# Patient Record
Sex: FEMALE | Race: WHITE | NOT HISPANIC OR LATINO | URBAN - METROPOLITAN AREA
[De-identification: names, ages, dates, MRNs, and addresses within clinical notes are randomized per-mention and may not be internally consistent; named-entity substitution may affect disease eponyms.]

---

## 2018-11-01 ENCOUNTER — RECORDS - HEALTHEAST (OUTPATIENT)
Dept: LAB | Facility: CLINIC | Age: 83
End: 2018-11-01

## 2018-11-01 LAB
ALBUMIN SERPL-MCNC: 3.7 G/DL (ref 3.5–5)
ALP SERPL-CCNC: 57 U/L (ref 45–120)
ALT SERPL W P-5'-P-CCNC: 11 U/L (ref 0–45)
ANION GAP SERPL CALCULATED.3IONS-SCNC: 8 MMOL/L (ref 5–18)
AST SERPL W P-5'-P-CCNC: 16 U/L (ref 0–40)
BILIRUB SERPL-MCNC: 0.5 MG/DL (ref 0–1)
BUN SERPL-MCNC: 27 MG/DL (ref 8–28)
CALCIUM SERPL-MCNC: 9.6 MG/DL (ref 8.5–10.5)
CHLORIDE BLD-SCNC: 100 MMOL/L (ref 98–107)
CO2 SERPL-SCNC: 31 MMOL/L (ref 22–31)
CREAT SERPL-MCNC: 1.04 MG/DL (ref 0.6–1.1)
GFR SERPL CREATININE-BSD FRML MDRD: 51 ML/MIN/1.73M2
GLUCOSE BLD-MCNC: 106 MG/DL (ref 70–125)
POTASSIUM BLD-SCNC: 3.9 MMOL/L (ref 3.5–5)
PROT SERPL-MCNC: 6.6 G/DL (ref 6–8)
SODIUM SERPL-SCNC: 139 MMOL/L (ref 136–145)
TSH SERPL DL<=0.005 MIU/L-ACNC: 0.14 UIU/ML (ref 0.3–5)

## 2018-11-29 ENCOUNTER — AMBULATORY - HEALTHEAST (OUTPATIENT)
Dept: NEUROLOGY | Facility: CLINIC | Age: 83
End: 2018-11-29

## 2018-11-30 ENCOUNTER — HOSPITAL ENCOUNTER (OUTPATIENT)
Dept: NEUROLOGY | Facility: CLINIC | Age: 83
Setting detail: THERAPIES SERIES
Discharge: STILL ADMITTED OR ADMINISTRATIVE | End: 2018-11-30
Attending: SOCIAL WORKER

## 2018-11-30 ENCOUNTER — HOSPITAL ENCOUNTER (OUTPATIENT)
Dept: NEUROLOGY | Facility: CLINIC | Age: 83
Setting detail: THERAPIES SERIES
Discharge: STILL ADMITTED OR ADMINISTRATIVE | End: 2018-11-30
Attending: PSYCHIATRY & NEUROLOGY

## 2018-11-30 DIAGNOSIS — G30.1 LATE ONSET ALZHEIMER'S DISEASE WITHOUT BEHAVIORAL DISTURBANCE (H): ICD-10-CM

## 2018-11-30 DIAGNOSIS — F02.80 LATE ONSET ALZHEIMER'S DISEASE WITHOUT BEHAVIORAL DISTURBANCE (H): ICD-10-CM

## 2018-12-05 ENCOUNTER — HOSPITAL ENCOUNTER (OUTPATIENT)
Dept: NEUROLOGY | Facility: CLINIC | Age: 83
Setting detail: THERAPIES SERIES
Discharge: STILL ADMITTED OR ADMINISTRATIVE | End: 2018-12-05
Attending: SOCIAL WORKER

## 2018-12-05 ENCOUNTER — HOSPITAL ENCOUNTER (OUTPATIENT)
Dept: OCCUPATIONAL THERAPY | Age: 83
Setting detail: THERAPIES SERIES
Discharge: STILL ADMITTED OR ADMINISTRATIVE | End: 2018-12-05
Attending: PSYCHIATRY & NEUROLOGY

## 2018-12-05 DIAGNOSIS — G30.1 LATE ONSET ALZHEIMER'S DISEASE WITHOUT BEHAVIORAL DISTURBANCE (H): ICD-10-CM

## 2018-12-05 DIAGNOSIS — R41.89 COGNITIVE IMPAIRMENT: ICD-10-CM

## 2018-12-05 DIAGNOSIS — F02.80 ALZHEIMER DISEASE (H): ICD-10-CM

## 2018-12-05 DIAGNOSIS — G30.9 ALZHEIMER DISEASE (H): ICD-10-CM

## 2018-12-05 DIAGNOSIS — F02.80 LATE ONSET ALZHEIMER'S DISEASE WITHOUT BEHAVIORAL DISTURBANCE (H): ICD-10-CM

## 2019-04-29 ENCOUNTER — PATIENT OUTREACH (OUTPATIENT)
Dept: CARE COORDINATION | Facility: CLINIC | Age: 84
End: 2019-04-29

## 2019-04-29 DIAGNOSIS — F02.80 ALZHEIMER'S DISEASE (H): Primary | ICD-10-CM

## 2019-04-29 DIAGNOSIS — G30.9 ALZHEIMER'S DISEASE (H): Primary | ICD-10-CM

## 2019-04-29 NOTE — PROGRESS NOTES
" Contact  [facility]/Voicemail    Referral Source: Care Team- RN Pardeep talked with daughter Yenny about patient care. Patient lives in memory care in assisted living.  She had been living at [facility] and doing well. Brother moved her to [place] and she has been agitated since the move.  [place] nurse called requesting an increase in medication.  Yenny agreed with having a call from .   Clinical Data:  Outreach  Outreach attempted x 1.  Left message on voicemail with call back information and requested return call.  Plan:   will try to reach caregiver again in 1-2 business days.  Roya Mann,   [facility]  [phone]     Contact  [facility]/Voicemail    Referral Source: Care Team  Clinical Data:  Outreach  Outreach attempted x 2.  Left message on voicemail with call back information and requested return call.  Plan:  will mail out  introduction letter with  information and explanation of social work services.  will do no further outreaches at this time.     Contact     Call from Yenny. Who apologized for delay in calling .     Assessment: Yenny reviewed situation of caring for mom and the difficulties working with her family to agree on appropriate treatment for patient as they have differing views on what she needs. Yenny has been very involved in patient's care and now she is living 30 minutes from her home.  She and her brother disagree on some things and Yenny is trying new ways of working with brother who also wants to be in charge.  Yenny feels that her family is not handling the stresses of caregiving well.      Provided support for Yenny and encouraged her to practise good self care.  She has seen a therapist in the past to help her overcome her \"Presybeterian guilt.\"  She is trying to focus on joyful things like her daughter's wedding and to keep with with her work outs.  " She has been experiencing chronic migraines and wants to keep stress low.  Offered referral to [employer] if her family would like to meet to discuss caregiving.  She will call if she wants referral.      Plan/Recommendations: No further outreach by  as patient is not in shared savings plans.  Yenny to call if needs arise.      Roya Mann,   [facility]  [phone]

## 2019-05-03 ASSESSMENT — ACTIVITIES OF DAILY LIVING (ADL)
DEPENDENT_IADLS:: CLEANING;COOKING;LAUNDRY;SHOPPING;MEAL PREPARATION;MEDICATION MANAGEMENT;MONEY MANAGEMENT;TRANSPORTATION

## 2019-11-03 ENCOUNTER — OFFICE VISIT - HEALTHEAST (OUTPATIENT)
Dept: FAMILY MEDICINE | Facility: CLINIC | Age: 84
End: 2019-11-03

## 2019-11-03 DIAGNOSIS — M25.531 PAIN AND SWELLING OF WRIST, RIGHT: ICD-10-CM

## 2019-11-03 DIAGNOSIS — M19.031 PRIMARY OSTEOARTHRITIS OF RIGHT WRIST: ICD-10-CM

## 2019-11-03 DIAGNOSIS — M25.431 PAIN AND SWELLING OF WRIST, RIGHT: ICD-10-CM

## 2019-11-03 DIAGNOSIS — M79.89 SWELLING OF RIGHT HAND: ICD-10-CM

## 2019-11-03 DIAGNOSIS — M19.041 PRIMARY OSTEOARTHRITIS OF RIGHT HAND: ICD-10-CM

## 2021-01-18 ENCOUNTER — COMMUNICATION - HEALTHEAST (OUTPATIENT)
Dept: SCHEDULING | Facility: CLINIC | Age: 86
End: 2021-01-18

## 2021-01-27 ENCOUNTER — AMBULATORY - HEALTHEAST (OUTPATIENT)
Dept: OTHER | Facility: CLINIC | Age: 86
End: 2021-01-27

## 2021-01-27 ENCOUNTER — DOCUMENTATION ONLY (OUTPATIENT)
Dept: OTHER | Facility: CLINIC | Age: 86
End: 2021-01-27

## 2021-02-11 ENCOUNTER — COMMUNICATION - HEALTHEAST (OUTPATIENT)
Dept: SCHEDULING | Facility: CLINIC | Age: 86
End: 2021-02-11

## 2021-02-25 ENCOUNTER — AMBULATORY - HEALTHEAST (OUTPATIENT)
Dept: OTHER | Facility: CLINIC | Age: 86
End: 2021-02-25

## 2021-05-25 ENCOUNTER — RECORDS - HEALTHEAST (OUTPATIENT)
Dept: ADMINISTRATIVE | Facility: CLINIC | Age: 86
End: 2021-05-25

## 2021-05-26 ENCOUNTER — RECORDS - HEALTHEAST (OUTPATIENT)
Dept: ADMINISTRATIVE | Facility: CLINIC | Age: 86
End: 2021-05-26

## 2021-05-26 VITALS
DIASTOLIC BLOOD PRESSURE: 59 MMHG | SYSTOLIC BLOOD PRESSURE: 99 MMHG | RESPIRATION RATE: 18 BRPM | OXYGEN SATURATION: 98 % | HEART RATE: 60 BPM

## 2021-05-28 ENCOUNTER — RECORDS - HEALTHEAST (OUTPATIENT)
Dept: ADMINISTRATIVE | Facility: CLINIC | Age: 86
End: 2021-05-28

## 2021-06-01 ENCOUNTER — RECORDS - HEALTHEAST (OUTPATIENT)
Dept: ADMINISTRATIVE | Facility: CLINIC | Age: 86
End: 2021-06-01

## 2021-06-02 ENCOUNTER — RECORDS - HEALTHEAST (OUTPATIENT)
Dept: ADMINISTRATIVE | Facility: CLINIC | Age: 86
End: 2021-06-02

## 2021-06-16 PROBLEM — R41.0 CONFUSION: Status: ACTIVE | Noted: 2021-01-23

## 2021-06-16 PROBLEM — R53.1 GENERAL WEAKNESS: Status: ACTIVE | Noted: 2021-01-21

## 2021-06-16 PROBLEM — R62.7 FAILURE TO THRIVE IN ADULT: Status: ACTIVE | Noted: 2021-01-22

## 2021-06-16 PROBLEM — N17.9 AKI (ACUTE KIDNEY INJURY) (H): Status: ACTIVE | Noted: 2021-01-18

## 2021-06-16 PROBLEM — R29.6 FALLS FREQUENTLY: Status: ACTIVE | Noted: 2021-01-17

## 2021-06-16 PROBLEM — R94.31 LONG QT INTERVAL: Status: ACTIVE | Noted: 2021-01-18

## 2021-06-16 PROBLEM — E87.1 HYPONATREMIA: Status: ACTIVE | Noted: 2021-01-18

## 2021-06-16 PROBLEM — E87.6 HYPOKALEMIA: Status: ACTIVE | Noted: 2021-01-18

## 2021-06-16 PROBLEM — E44.0 MODERATE MALNUTRITION (H): Status: ACTIVE | Noted: 2021-01-18

## 2021-06-20 ENCOUNTER — HEALTH MAINTENANCE LETTER (OUTPATIENT)
Age: 86
End: 2021-06-20

## 2021-06-22 NOTE — PROGRESS NOTES
Persons accompanying you (the patient) today: Daughter    How have you been doing since we saw you last? Please note any concerns.  Pt. Says she has been doing good since the last time we saw her.    Please list any recent hospitalizations/surgeries/procedures you've had since we saw you last:  Pt. Says that she was hospitalized because of her fall a couple of years ago.    Have you had any falls since your last visit? Yes: Fell and broke her neck about 3 or 4 years ago    Do you have any pain today? No

## 2021-06-22 NOTE — PROGRESS NOTES
".OUT PATIENT CLINICAL SOCIAL WORK: PSYCHOSOCIAL ASSESSMENT      Citlali Graff   1935 12/5/2018    Primary Language: English  Referral Source: Elizabeth Gonzalez MD   Person(s) Present at Visit: Patient, Daughter Cherrie and Social Work Intern  Goal(s) for Visit: Consultation and Ongoing Care  Presenting Concerns: Citlali's daughter Cherrie reports her mother may need more structure care.  Patient is currently residing in an independently living facility.  Cherrie reports Citlali needs assistance with daily functions, pills, and dressing.  Patient Citlali reports she does not \"give a care\".  Cherrie reports her mother has experienced progressive changes in cognition function since the last time she has seen Dr. Gotti.      Citlali Graff is a 83 y.o. female who has a diagnosis of Dementia the Alzheimer type per medical records.  Per medical records 2012 patient was diagnosed with Alzheimer's disease and began therapy with cholinesterase inhibitor treatment.  Medicine therapies were eventually discontinued as the patient was living at home and unable to remain compliant with treatment.        PRIMARY DECISION MAKER FOR HEALTHCARE  Patient, there is no current  is aware of regarding patients capacity from a medical standpoint.  Daughter note she is patient's healthcare agent.   Copy of legal documents on chart? No - Requested copy from: Dali Grier     Name: Marizol VELOZ   Relationship: Daughter Home #: [phone]    Work/Mobile #:     Additional Information: None reported by patient and family       PATIENT REPRESENTATIVE  Same as above    Additional Contacts: None reported by patient and family     Primary Decision Maker for Healthcare provides verbal authorization for this clinic to have care coordination conversations with the above contacts as needed: YES     HEALTHCARE DIRECTIVE/LEGAL ISSUES  Pt has healthcare directive: YES    Name: Marizol RG     Contact information: [phone]    If " yes, copy of documents on chart? No - Requested copy from: Daughter Marizol       Additional Information: None reported by patient and family     FINANCIAL INFORMATION  Primary Funding Source: [employer]  Secondary Funding Source: None    Additional Financial Information: None reported by patient and family     Financial POA: Yes - who: Nelida Daley    SSI / SSDI: None reported by patient and family     Social Security: None reported by patient and family     : None reported by patient and family     VA Benefits: Branch:  None reported by patient and family                      How long: None reported by patient and family     LTC Insurance: None reported by patient and family     Medical Assistance (MA) Status:     None reported by patient and family       Waiver Services: None reported by patient and family       OCCUPATION / EDUCATION:  Current Employment: None reported by patient and family   Prior Occupation(s): None reported by patient and family     BELIEF SYSTEM: [facility]    MEDICAL:  Please see  Dr. Gotti consultation from 11/30/2018 for complete medical history.  [facility] MD/Clinic: Elizabeth Matamoros MD     Additional Information/Concerns: None reported by patient and family     CHEMICAL HEALTH:  Current Tobacco Use: None reported by patient and family   Prior Tobacco Use: None reported by patient and family      Current Alcohol/Drug Use: 2 drinks per month     Treatment: None reported by patient and family   Prior Alcohol/Drug Use: None reported by patient and family      Treatment: None reported by patient and family   Additional Information/Concerns:  None reported by patient and family     PSYCHIATRIC / BEHAVIORAL:  Current Dx: None reported by patient and family   Current Treatment: None reported by patient and family   Prior Dx: None reported by patient and family   Prior treatment: None reported by patient and family   Additional Information/Concerns: None reported by patient and family      HOME / LIVING ENVIRONMENT:  Patient lives: [address]  Home Accessibility Concerns: None  Additional Information/Concerns: None reported by patient and family     COMMUNITY / SOCIAL SUPPORT:   Community services: None reported by patient and family     Additional Information/Concerns: None reported by patient and family     FAMILY SUPPORT:  Relationship Status:   Children: 6 children  Additional Information/Concerns: None reported by patient and family       DAILY ROUTINE:  Sleep-None reported by patient and family     Nutrition-None reported by patient and family   Activities-None reported by patient and family      Hobbies-None reported by patient and family   Additional Information/Concerns: None reported by patient and family       FUNCTIONAL STATUS:  Is patient driving?  NO   Additional Information: None reported by patient and family   Is patient independent with the following activities? Per patient's daughter Cherrie patient needs assistance with daily functions  Additional Information/Concerns: None reported by patient and family       PRIOR COGNITIVE TESTING / IMAGING: None reported by patient and family     INTERVENTION(S):  Assessment  Additional Information: None reported by patient and family     PATIENT/FAMILY OBSERVATIONS:  Patient: Patient appears pleasant, neatly groomed and engaging in conversation.    Family/Caregiver: Family appeared pleasant, and engaging in conversation.  Family appears very involved and supportive of patient and her overall wellbeing.     PLAN/FOLLOW-UP: Writer provided description of social work's role in the clinic and contact information.  Social work will continue to be available.        Timothy Man Social Work Intern  11/30/2018  0930    I have read, discussed, and agree with the documentation as presented by Timothy Man Social Work Intern.     Roya Hoffmann, [facility]  11/30/2018  0927

## 2021-06-22 NOTE — PROGRESS NOTES
Occupational Therapy  Occupational Therapy    [employer] Insurance    Units: 1 Eval, 1 ADL  Total Minutes: 55    Medical Diagnosis:   1. Cognitive impairment     2. Late onset Alzheimer's disease without behavioral disturbance  OT eval and treat    OT eval and treat     Treatment Diagnosis: Cognitive Impairment  Referring Practitioner: Dr. Hammad Power  Date of onset: 12-5-18  Start of care: December 5, 2018    Ms.Arlene KEERTHI Graff was referred by Dr. Power for an occupational therapy outpatient cognitive evaluation. The assessments used in this evaluation will help determine if cognitive impairment is present and if so, how functional daily activity may be affected.  Following the assessments, the occupational therapist may offer education and recommendations to assist caregivers in providing the optimal level of support for their loved one. Citlali was seen on 12/5/2018 and was accompanied by her daughter Cherrie.  Citlali presented as appropriately groomed and dressed, ambulating independently. She was pleasant and socially interactive, and fully cooperative with the evaluation. However, at the end of our appointment she did appear slightly irritable and impatient to leave when the conversation of her moving out of her current apartment was taking place. She is currently living in an independent senior apartment, and according to Cherrie, she is not doing well in this independent setting. Cherrie described situations where her mother wanders the halls in her bathrobe, burns toast in the toaster, is mismanaging her pills and is losing weight- likely not eating consistently.    Pain: none stated    OBJECTIVE  Results of assessments are as follows:    ACL: 3.8 /5.8  CPT: 3.6 /5.6  MOCA: 6 /30    The above assessment scores indicate a Moderate- Severe level of impairment.     ASSESSMENT  Recommendations:  Cognitive functioning recommendations 3.6-3.8: The assessment scores indicate a recommendation of close 24 hour  supervision with a secure environment. Citlali will benefit from 1-step cues to initiate and complete familiar tasks such as dressing, bathing, toileting, etc. She will often times need the task set-up as she may lack the problem solving and judgment required to complete these tasks fully, safely and in a timely manner. Complex tasks such as meal preparation, medication and financial management should be managed for Citlali- meaning, medications should be administered directly to her each day, meals provided to her throughout the day and financial matters managed by another trusted individual.   Behaviors are often not goal directed and perseveration on tasks will result. Driving is not recommended. Citlali should be encouraged to participate in familiar or simple activities.  Daily physical and social activities would be very appropriate for Citlali to participate in, but she may require initiation to these tasks and activities from others/staff.  Citlali s attention span is significantly impaired and she needs cues to complete given tasks. Further, Citlali will benefit from structured daily to eliminate the need to problem solve from one task to another as she lacks initiation of tasks.    Following the evaluation, I discussed the test results and the above mentioned recommendations with Cherrie in Citlali's presence. I would recommend that Citlali transfer to a memory care unit as soon as possible to ensure her safety and well being with appropriate nutrition, supervision, medication management and the increased structure. Cherrie verbalized her understanding and all questions for OT were answered at this time.     PLAN  Goal: Patient and/or family will verbalize understanding of recommendations regarding instrumental activities of daily living and activities of daily living.    Plan of Care: Patient/ Family instruction: Expected Functional Outcomes     Thank you for this referral.  If I can be of further assistance, please  do not hesitate to contact me.    MEDICARE PATIENT: No    Physician Recommendation:  1. I certify the need for these services furnished within this plan and while under my care. I agree with the therapist's recommendation for plan of care.  2. If there is any recommendation for modification of therapy plan, please indicate below.    Justine Price, OT

## 2021-06-22 NOTE — PROGRESS NOTES
Writer met with patient and patient's daughter Cherrie today after patient participated in the OT assessment.  Based on patient's OT testing and Dr. Power assessment, the team is recommending 24/7 memory care assisted living.  Cherrie at this time is uncertain if a family conference is needed in January with   Dr. Power although, this may still be necessary depending on the reaction of patient's other children with respect to the recommendations from the care team.    Plan: Be available for support by patient and family with additional resource information as needed.    Roya Hoffmann [facility]  12/5/18  1500

## 2021-06-22 NOTE — PROGRESS NOTES
Assessment / Impression   1.  Dementia the Alzheimer type  2.  Other medical problems as noted in past medical history      Plan:   1.  Occupational Therapy assessment.  The patient requires dementia level programming based on her presentation today.  In addition, some consideration for disease specific therapy should be reconsidered  2.  Family conference to discuss results of OT assessment and to discuss in more detail a conference of treatment program including importance of appropriate environmental support, disease specific therapies and wellness    Long conversation held with the patient and daughter Cherrie in attendance.  This patient who was diagnosed in 2012 with dementia the Alzheimer type has experienced progressive changes in cognition function and behavior consistent with a IRENA diagnosis.  Unfortunately, the patient's children have been experiencing great difficulty in understanding the nature of the patient's illness as best I can determine and the most appropriate management strategy moving forward.  It is my perception that an OT evaluation followed by family conference may be helpful in this regard.    Total time for evaluation one hour with majority time spent in counseling.      Subjective:     HPI: Citlali Graff is a 83 y.o. female with dementia the Alzheimer type who presents for follow-up.  The patient is a been seen by this examiner and I believe 4-5 years.  Presenting in late 2012 with a greater than 1 year history of difficulties with short-term memory and spatial orientation, I did diagnose Alzheimer's disease and begin therapy with cholinesterase inhibitor treatment at that time.  Medicine therapies were eventually discontinued as the patient was living at home and unable to remain compliant with such treatment.  Since last seen, the patient was moved to a senior campus where she occupies an independent living apartment this occurring in August 2016.  The move was prompted by  increasing concerns regarding the patient's safety within the home environment.  Cherrie DENISE, 1 of the patient's children who accompanies her today, reports that she has been providing near total care to her mother since her admission to the independent living environment.  Certainly over the past 6-8 months this would be the case that she is now providing assistance with bathing and dressing.  There apparently has been significant strife among the children with respect to the best way to care for the patient and I believe this may be creating some difficulties for the patient as well as Cherrie and the other principal caretaker.  These difficulties within the family appear to be significant drivers in creating stress for the principal caretaker and patient and I believe this to be the reason for the patient's presentation today.  Cherrie wishes to have help in regard to these matters as well as further advice in terms of the appropriate care for her mother.    The patient presents as a well-developed well-nourished and neatly groomed elderly female who appears in no acute physical distress.  Demonstrating a dense anosognosia, the patient reports that she is aware of no significant cognitive concerns she has and has no need for receiving assistance in completing her daily routine.  She denies somatic complaints at this time.  She denies emotional difficulties but does seem to have some awareness of the family strife alluded to above.          Review of Systems:          As above        Objective:     Vitals:    11/30/18 0956 11/30/18 0957 11/30/18 0958   BP: 131/63 131/71 132/67   Pulse: 63 70 72       No results found for this or any previous visit (from the past 24 hour(s)).    Physical Exam: On physical examination the patient is as outlined above.  Alert and attentive, she engages in very little spontaneous conversation.  Thought content appears to be reduced.  Responses to questions are generally devoid of content but  socially appropriate.  She is unable to recall any current events or the current president.  She reports that Theodore Aly service present 10 years ago.  She states the current month is April in the current year is 2010.  She cannot encode new information presented during the course of this examination.  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  Evidence of apraxia is also noted.  Affect is pleasant and mood likely congruent.  No abnormal thought content or form.

## 2021-06-22 NOTE — PROGRESS NOTES
Writer received a call from patient's daughter stated that she is her mother's healthcare agent.  She reports the last time her mother saw Dr. Power was in 2014.  He reports that her mother's living independently at [facility] although, in the [facility] book is listed as assisted living with memory care available as well.  Daughter stated, that her mother needs constant cueing for dressing medications set up reminders and is seen her mother needing more services than what she is currently receiving in her current apartment setting.    Plan: Daughter is hoping that through her assessment with Dr. Power tomorrow information can be obtained in order to plan to either additional services or transfer to the memory care assisted living if deemed appropriate by provider.    Roya Hoffmann [facility]  11/29/18  1400

## 2021-06-28 NOTE — PROGRESS NOTES
Progress Notes by Cynthia Cordero MD at 11/3/2019 12:30 PM     Author: Cynthia Cordero MD Service: -- Author Type: Physician    Filed: 11/3/2019  3:24 PM Encounter Date: 11/3/2019 Status: Signed    : Cynthia Cordero MD (Physician)         Subjective:   Citlali Graff is a 84 y.o. female and is new to [facility].  Roomed by: KEISHA Daniel     Accompanied by Son daughter in law      Chief Complaint   Patient presents with   ? Hand Pain     R/T hand swollen and pain, not sure if there was an injury. Noticed swelling yesterday 11/2 per care facility.    Patient not a reliable historian. History is from son and daughter in law.   Son says she was told by the nursing care staff that patient's right hand was swollen yesterday. A staff person who saw her yesterday told the nurse there that her hand looked better today compared to yesterday. Right hand was swollen yesterday. Patient admits decreased ROM, decreased strength and pain with palpation.  Sons denies any personal history in his mother of gout.    PMH - hypothyroidism, depression  PSH - none  FX - HTN - none, DM - none, Asthma - none, CAD - none, Cancer - none  SX - Living in a memory care unit.     Patient Active Problem List   Diagnosis   ? Dementia of the Alzheimer's type (H)     Social History     Tobacco Use   ? Smoking status: Never Smoker   ? Smokeless tobacco: Never Used   Substance Use Topics   ? Alcohol use: Not on file   ? Drug use: Not on file      Review of Systems  See HPI for ROS, otherwise balance of other systems negative  Allergies   Allergen Reactions   ? Caffeine    ? Penicillins        Current Outpatient Medications:   ?  escitalopram oxalate (LEXAPRO) 20 MG tablet, Take 20 mg by mouth once daily., Disp: , Rfl:   ?  FLUoxetine (PROZAC) 20 MG capsule, Take 20 mg by mouth daily., Disp: , Rfl:   ?  levothyroxine (LEVOTHROID) 88 MCG tablet, Take 88 mcg by mouth daily., Disp: , Rfl:   ?  triamcinolone (KENALOG) 0.5 % cream,  Apply topically 3 (three) times a day., Disp: , Rfl:   ?  ACETAMINOPHEN (TYLENOL ORAL), Take 1-2 tablets by mouth as needed., Disp: , Rfl:   ?  atenolol-chlorthalidone (TENORETIC) 50-25 mg per tablet, Take 1 tablet by mouth daily., Disp: , Rfl:   ?  ciclopirox (PENLAC) 8 % solution, Apply topically at bedtime Apply over nail and surrounding skin. Apply daily over previous coat. After seven (7) days, may remove with alcohol and continue cycle. ., Disp: , Rfl:   ?  donepezil (ARICEPT) 5 MG tablet, Take 5 mg by mouth daily., Disp: , Rfl:   ?  vitamin E 1000 UNIT capsule, Take 1,000 Units by mouth daily., Disp: , Rfl:     Objective:     Vitals:    11/03/19 1233   BP: 99/59   Patient Site: Left Arm   Patient Position: Sitting   Cuff Size: Adult Regular   Pulse: 60   Resp: 18   SpO2: 98%   Vital signed reviewed  Gen - Pt in NAD  Eyes - PERRL,EOMI, Conjunctiva clear  Ears - hearing intact  Nose - not congested, no nasal drainage, turbinates not inflamed, septum not deviated  Pharynx - not injected, tonsils 1+ size  Neck - supple, no cervical adenopathy, no masses  Cor - RRR w/o murmur  Lungs - Good air entry; no wheezes or crackles noted on auscultation - no coughing noted  MS - Right wrist with diffuse swelling, right hands with diffuse swelling, slightly decreased ROM, + TTP   Neuro - non focal  Psych - Affect - slightly flat/euthymic, well groomed, speech not pressured, good insight, no flight of ideas  Skin - no lesions, no rashes noted    Xr Hand Right 3 Or More Vws    Result Date: 11/3/2019  EXAM DATE:         11/03/2019 EXAM: X-RAY HAND RIGHT, MINIMUM 3 VIEWS LOCATION: [facility] DATE/TIME: 11/3/2019 1:30 PM INDICATION: Other specified soft tissue disorders COMPARISON: None. IMPRESSION: The distal radius and ulna are intact. There is mild calcification in the triangular fibrocartilage. Normal radiocarpal alignment. The carpal bones are intact with preserved joint spaces. The metacarpals and  phalanges are intact. There is mild loss of joint space of the second and third MCP joints. Minimal degenerative change of the IP joints normal bone mineralization. No erosions are present. No fracture or malalignment.     Radiologist's report discussed with patient, son and daughter-in-law on day of visit.     Assessment - Plan   Medical Decision Making -84-year-old woman with a history of dementia presents with having a swollen right hand and wrist noticed in the a.m. in the nursing home.  Of note is that nursing home staff said that the swelling in her right hand and actually was worse yesterday than today.  There was no known history of a fall.  Patient does complain of pain to palpation.  On exam patient's right wrist and hand were diffusely swollen with some decreased flexion and some tenderness to palpation.  Balance of her exam was unremarkable.  An x-ray showed no fracture or dislocation but did shows some diffuse degenerative changes in her metacarpals and IP joints.  We will have patient start on some ibuprofen scheduled and have her follow-up with her primary hopefully within the next 5 days.  She can still take Tylenol as needed for pain.  Discussed with her son and daughter-in-law that if this was gout that this would also be an initial treatment for gout.  See patient instructions.    1. Primary osteoarthritis of right wrist    2. Primary osteoarthritis of right hand    3. Swelling of right hand  - XR Hand Right 3 or More VWS; Future  - XR Hand Right 3 or More VWS    4. Pain and swelling of wrist, right  - XR Wrist Right 3 or More VWS; Future  - XR Wrist Right 3 or More VWS    At the conclusion of the encounter, assessment and plan were discussed. All questions were answered. The patient or guardian acknowledged understanding and was involved in the decision making regarding the overall care plan.    Patient Instructions   1. Make an appointment for follow up with primary care provider  2. Ok to start  taking ibuprofen 600 mg three times a day x 1 week or until she is seen by her primary care   3. May take tylenol as needed for pain  4. If symptoms are getting worse over time or you have any questions, call the clinic number - it's answered 24/7    Patient Education     Osteoarthritis  Osteoarthritis (also called degenerative joint disease) happens when the cartilage in a joint becomes damaged and worn. This may be due to age, wear and tear, overuse of the joint, or other problems. Osteoarthritis can affect any joint. But it is most common in hands, knees, spine, hips, and feet. Symptoms include joint stiffness, pain, and swelling.  Home care    When a joint is more sore than usual, rest it for a day or two.    Heat can help relieve stiffness. Take a hot bath or apply a heating pad for up to 30 minutes at a time. If symptoms are worse in the morning, using heat just after awakening can help relax the muscle and soothe the joints.     Ice helps relieve pain and swelling. It is often used after activity. Use a cold pack wrapped in a thin cloth on the joint for 10 to 15 minutes at a time.     Alternating hot and cold can also help relieve pain. Try this for 20 minutes at a time, several times per day.    Exercise helps prevent the muscles and ligaments around the joint from becoming weak. It also helps maintain function in the joint.  Be as active as you can. Talk to your healthcare provider about what activity program is best for you.    Excess weight puts a lot of extra strain on weight-bearing joints of the lower back, hips, knees, feet and ankles. If you are overweight, talk to your healthcare provider about a safe and effective weight loss program.    Use anti-inflammatory medicines as prescribed for pain. This includes acetaminophen or NSAIDs such as ibuprofen or naproxen. If needed, topical or injected medicines may be recommended. Talk to your healthcare provider if these options are not enough to manage your  pain.    Talk with your healthcare provider about devices that might help improve your function and reduce pain.  Follow-up care  Follow up with your healthcare provider as advised by our staff.  When to seek medical advice  Call your healthcare provider right away if any of these occur:    Redness or swelling of a painful joint    Discharge or pus from a painful joint    Fever of 100.4 F (38 C) or higher, or as directed by your healthcare provider    Worsening joint pain    Decreased ability to move the joint or bear weight on the joint  Date Last Reviewed: 3/1/2017    3259-9464 [employer]. [address] [place] [address]. All rights reserved. This information is not intended as a substitute for professional medical care. Always follow your healthcare professional's instructions.

## 2021-07-03 NOTE — ADDENDUM NOTE
Addendum Note by Justine Price OT at 12/5/2018 11:59 PM     Author: Justine Price OT Service: -- Author Type: Occupational Therapist    Filed: 1/9/2019  1:16 PM Date of Service: 12/5/2018 11:59 PM Status: Signed    : Justine Price OT (Occupational Therapist)    Encounter addended by: Justine Price OT on: 1/9/2019  1:16 PM      Actions taken: Visit Navigator Flowsheet section accepted

## 2021-07-03 NOTE — ADDENDUM NOTE
Addendum Note by Justine Price OT at 12/5/2018 11:59 PM     Author: Justine Price OT Service: -- Author Type: Occupational Therapist    Filed: 12/6/2018  8:01 AM Date of Service: 12/5/2018 11:59 PM Status: Signed    : Justine Price OT (Occupational Therapist)    Encounter addended by: Justine Price OT on: 12/6/2018  8:01 AM      Actions taken: Charge Capture section accepted

## 2021-07-03 NOTE — ADDENDUM NOTE
Addendum Note by Paola Germain CMA at 11/30/2018 12:44 PM     Author: Paola Germain CMA Service: -- Author Type: Certified Medical Assistant    Filed: 11/30/2018 12:44 PM Date of Service: 11/30/2018 12:44 PM Status: Signed    : Paola Germain CMA (Certified Medical Assistant)    Encounter addended by: Paola Germain CMA on: 11/30/2018 12:44 PM      Actions taken: Charge Capture section accepted

## 2021-07-13 ENCOUNTER — RECORDS - HEALTHEAST (OUTPATIENT)
Dept: ADMINISTRATIVE | Facility: CLINIC | Age: 86
End: 2021-07-13

## 2021-07-21 ENCOUNTER — RECORDS - HEALTHEAST (OUTPATIENT)
Dept: ADMINISTRATIVE | Facility: CLINIC | Age: 86
End: 2021-07-21

## 2021-10-10 ENCOUNTER — HEALTH MAINTENANCE LETTER (OUTPATIENT)
Age: 86
End: 2021-10-10

## 2022-07-16 ENCOUNTER — HEALTH MAINTENANCE LETTER (OUTPATIENT)
Age: 87
End: 2022-07-16

## 2022-08-02 NOTE — PATIENT INSTRUCTIONS - HE
Notified of lab results and refills.   Patient Instructions by Cynthia Cordero MD at 11/3/2019 12:30 PM     Author: Cynthia Cordero MD Service: -- Author Type: Physician    Filed: 11/3/2019  1:53 PM Encounter Date: 11/3/2019 Status: Addendum    : Cynthia Cordero MD (Physician)    Related Notes: Original Note by Cynthia Cordero MD (Physician) filed at 11/3/2019  1:53 PM       1. Make an appointment for follow up with primary care provider  2. Ok to start taking ibuprofen 600 mg three times a day x 1 week or until she is seen by her primary care   3. May take tylenol as needed for pain  4. If symptoms are getting worse over time or you have any questions, call the clinic number - it's answered 24/7    Patient Education     Osteoarthritis  Osteoarthritis (also called degenerative joint disease) happens when the cartilage in a joint becomes damaged and worn. This may be due to age, wear and tear, overuse of the joint, or other problems. Osteoarthritis can affect any joint. But it is most common in hands, knees, spine, hips, and feet. Symptoms include joint stiffness, pain, and swelling.  Home care    When a joint is more sore than usual, rest it for a day or two.    Heat can help relieve stiffness. Take a hot bath or apply a heating pad for up to 30 minutes at a time. If symptoms are worse in the morning, using heat just after awakening can help relax the muscle and soothe the joints.     Ice helps relieve pain and swelling. It is often used after activity. Use a cold pack wrapped in a thin cloth on the joint for 10 to 15 minutes at a time.     Alternating hot and cold can also help relieve pain. Try this for 20 minutes at a time, several times per day.    Exercise helps prevent the muscles and ligaments around the joint from becoming weak. It also helps maintain function in the joint.  Be as active as you can. Talk to your healthcare provider about what activity program is best for you.    Excess weight puts a lot of extra strain on  weight-bearing joints of the lower back, hips, knees, feet and ankles. If you are overweight, talk to your healthcare provider about a safe and effective weight loss program.    Use anti-inflammatory medicines as prescribed for pain. This includes acetaminophen or NSAIDs such as ibuprofen or naproxen. If needed, topical or injected medicines may be recommended. Talk to your healthcare provider if these options are not enough to manage your pain.    Talk with your healthcare provider about devices that might help improve your function and reduce pain.  Follow-up care  Follow up with your healthcare provider as advised by our staff.  When to seek medical advice  Call your healthcare provider right away if any of these occur:    Redness or swelling of a painful joint    Discharge or pus from a painful joint    Fever of 100.4 F (38 C) or higher, or as directed by your healthcare provider    Worsening joint pain    Decreased ability to move the joint or bear weight on the joint  Date Last Reviewed: 3/1/2017    [id number] [employer]. [address] [place] [address]. All rights reserved. This information is not intended as a substitute for professional medical care. Always follow your healthcare professional's instructions.

## 2022-09-18 ENCOUNTER — HEALTH MAINTENANCE LETTER (OUTPATIENT)
Age: 87
End: 2022-09-18

## 2023-07-30 ENCOUNTER — HEALTH MAINTENANCE LETTER (OUTPATIENT)
Age: 88
End: 2023-07-30